# Patient Record
Sex: FEMALE | Race: OTHER | NOT HISPANIC OR LATINO
[De-identification: names, ages, dates, MRNs, and addresses within clinical notes are randomized per-mention and may not be internally consistent; named-entity substitution may affect disease eponyms.]

---

## 2019-07-03 DIAGNOSIS — Z80.1 FAMILY HISTORY OF MALIGNANT NEOPLASM OF TRACHEA, BRONCHUS AND LUNG: ICD-10-CM

## 2019-07-03 DIAGNOSIS — I10 ESSENTIAL (PRIMARY) HYPERTENSION: ICD-10-CM

## 2019-07-03 PROBLEM — Z00.00 ENCOUNTER FOR PREVENTIVE HEALTH EXAMINATION: Status: ACTIVE | Noted: 2019-07-03

## 2019-07-03 RX ORDER — ATENOLOL 50 MG/1
50 TABLET ORAL
Refills: 0 | Status: ACTIVE | COMMUNITY

## 2019-07-18 ENCOUNTER — APPOINTMENT (OUTPATIENT)
Dept: GASTROENTEROLOGY | Facility: CLINIC | Age: 69
End: 2019-07-18
Payer: COMMERCIAL

## 2019-07-18 VITALS
WEIGHT: 145 LBS | HEART RATE: 64 BPM | BODY MASS INDEX: 25.69 KG/M2 | HEIGHT: 63 IN | SYSTOLIC BLOOD PRESSURE: 130 MMHG | DIASTOLIC BLOOD PRESSURE: 86 MMHG

## 2019-07-18 DIAGNOSIS — Z86.69 PERSONAL HISTORY OF OTHER DISEASES OF THE NERVOUS SYSTEM AND SENSE ORGANS: ICD-10-CM

## 2019-07-18 DIAGNOSIS — K29.30 CHRONIC SUPERFICIAL GASTRITIS W/OUT BLEEDING: ICD-10-CM

## 2019-07-18 PROCEDURE — 99244 OFF/OP CNSLTJ NEW/EST MOD 40: CPT

## 2019-07-18 NOTE — ASSESSMENT
[FreeTextEntry1] : Dark stool x 2 days, probably melena\par Cleared, no further bleeding \par Was taking NSAIDs/ASA\par R/O PUD, Gastritis\par \par P: No NSAIDS/ETOH\par    Anti-reflux diet\par    Pepcid--> nexium 20mg bid\par    EGD\par    get labs form PCP\par     check cbc, bmet\par \par \par \par \par \par 2. Gastritis: bettter, No N, V, early satiety, pain\par s/p probable melena\par * No H.Pylori, No IM, No Bile, No NSAIDs\par * Avoid NSAIDs / ETOH\par * No Prevpac, No Pylera, \par * +++PPI: Nexium 20mg bid  x 90 days,  No Carafate 1 gm QID\par *  for EGD  or  No UBT \par \par \par \par \par \par \par 2. Hemorrhoids:  well – controlled.  No pain,  swelling,  itch,  bleeding\par * Discussed the potential complications of thrombosis,  pain,  infection,  swelling, itching,  bleeding \par * High – Fiber Diet was reviewed and emphasized\par * 6  --  8 cups of decaffeinated fluid daily\par * Sitz Bathes BID,  No:  Anusol HC  Suppos / Cream  CO BID\par * No:  Tucks BID,   No:  Balneol Lotion,   No:  Calmoseptine Oint,   ++ Prep H prn\par * No:  Colorectal surgical evaluation for possible ablation \par \par \par \par \par \par 3. Colorectal Cancer Screening:  to be evaluated\par * Discussed the pre-malignant potential of polyps\par * Discussed the importance of f/u surveillance / screening\par * High-Fiber Diet was reviewed and emphasized\par * Anti-oxidants and ASA/NSAID Therapy reviewed\par * Given age > 45-51 yo\par * Recommend Colonoscopy\par * R/O  Colonic Neoplasia\par \par \par \par \par \par \par Informed Consent:\par * The risks & Benefits of EGD  /  Colonoscopy were discussed w patient.\par * This included but was not limited to perforation, bleeding, sedation /med rxns possibly requiring surgery, blood transfusions, antibiotics & CPR/Intubation.\par * Pt. understands & agrees to the procedures.\par * Pt. advised to D/C  ASA/NSAIDs  7  Days  \par * [ +++ ]  Dulcolax / Miralax / Mag. Citrate ,  [     ] Prepopik/ Clenpiq ,  [     ] Osmo Prep,  [    ] GoLytely,  prep. reviewed w Pt.\par * Hold  [           ] AM of procedure.\par * Hold  [           ] PM of procedure.\par * Take  [           ] PM of procedure.\par * Take  [   atenolol        ] AM of procedure.\par \par

## 2019-07-18 NOTE — HISTORY OF PRESENT ILLNESS
[de-identified] : \par PCP: Jack\par \par 68 yo F w h/o HTN, Glaucoma\par 6/2011 s/p adm w N/V--> coffee grounds and accelerated HTN )237/106); Hgb=14, BUN=13\par Rx w PPI for Gastritis\par Hemorrhoids\par \par Last year had a ankle FX, was taking advil prn + ASA 81 mg\par ~ 2 weeks ago, noted a black stool x 2, then brown\par Had mild epigastric pain x 1day--resolved\par stopped NSAIDs/ASA, took pepcid\par Had a CPX w Dr. Santiago recently stated Labs were fine \par \par \par Today: feeling well, no cp, sob. dizziness, weakness\par \par * Abd pain—resolved \par * Nausea—no\par * Vomit—no\par * Belching—no\par * Regurgitation—no\par * Ht burn—no\par * Throat Clearing—no\par * Globus—no\par * Cough—no\par * Hoarseness—no\par * Dysphagia—no\par * BMs: # 4 qd\par * Constipation—no\par * Diarrhea—no\par * Bloating—no\par * Flatulence—no\par * Gurgling—no\par * Melena—resolved \par * BPBPR—no\par * Anorexia—no\par * Wt. Loss-no\par \par \par \par

## 2019-08-15 ENCOUNTER — APPOINTMENT (OUTPATIENT)
Dept: GASTROENTEROLOGY | Facility: CLINIC | Age: 69
End: 2019-08-15
Payer: COMMERCIAL

## 2019-08-15 VITALS
HEIGHT: 63 IN | WEIGHT: 145 LBS | BODY MASS INDEX: 25.69 KG/M2 | HEART RATE: 69 BPM | SYSTOLIC BLOOD PRESSURE: 140 MMHG | DIASTOLIC BLOOD PRESSURE: 90 MMHG

## 2019-08-15 DIAGNOSIS — K92.1 MELENA: ICD-10-CM

## 2019-08-15 DIAGNOSIS — Z12.11 ENCOUNTER FOR SCREENING FOR MALIGNANT NEOPLASM OF COLON: ICD-10-CM

## 2019-08-15 DIAGNOSIS — R10.10 UPPER ABDOMINAL PAIN, UNSPECIFIED: ICD-10-CM

## 2019-08-15 DIAGNOSIS — K64.8 OTHER HEMORRHOIDS: ICD-10-CM

## 2019-08-15 PROCEDURE — 99214 OFFICE O/P EST MOD 30 MIN: CPT

## 2019-08-15 NOTE — HISTORY OF PRESENT ILLNESS
[de-identified] : \par PCP: Jack\par \par 68 yo F w h/o HTN, Glaucoma\par 6/2011 s/p adm w N/V--> coffee grounds and accelerated HTN )237/106); Hgb=14, BUN=13\par Rx w PPI for Gastritis\par Hemorrhoids\par \par 7/18/19 last seen: noted Last year had a ankle FX, was taking advil prn + ASA 81 mg\par ~ 2 weeks ago, noted a black stool x 2, then brown\par Had mild epigastric pain x 1day--resolved\par stopped NSAIDs/ASA, took pepcid\par Had CPX w Dr. Santiago recently :2/14/19: LDL=160, MS=846, cbc/cmet-wnl\par \par \par Today: feeling better, no cp, sob. dizziness, weakness\par No abd pain, BMs # 4 qd, no melena \par \par * Abd pain—resolved \par * Nausea—no\par * Vomit—no\par * Belching—no\par * Regurgitation—no\par * Ht burn—no\par * Throat Clearing—no\par * Globus—no\par * Cough—no\par * Hoarseness—no\par * Dysphagia—no\par * BMs: # 4 qd\par * Constipation—no\par * Diarrhea—no\par * Bloating—no\par * Flatulence—no\par * Gurgling—no\par * Melena—resolved \par * BPBPR—no\par * Anorexia—no\par * Wt. Loss-no\par \par \par \par

## 2019-08-15 NOTE — ASSESSMENT
[FreeTextEntry1] : \par Dark stool x 2 days, probably melena\par Cleared, no further bleeding \par Was taking NSAIDs/ASA\par R/O PUD, Gastritis\par \par P: No NSAIDS/ETOH\par    Anti-reflux diet\par    Pepcid--> nexium 20mg bid\par    EGD\par    \par     check cbc, bmet\par \par \par \par \par \par 2. Gastritis: better, No N, V, early satiety, pain\par s/p probable melena\par * No H.Pylori, No IM, No Bile, No NSAIDs\par * Avoid NSAIDs / ETOH\par * No Prevpac, No Pylera, \par * +++PPI: Nexium 20mg bid  x 90 days,  No Carafate 1 gm QID\par *  for EGD  or  No UBT \par \par \par \par \par \par \par 2. Hemorrhoids:  well – controlled.  No pain,  swelling,  itch,  bleeding\par * Discussed the potential complications of thrombosis,  pain,  infection,  swelling, itching,  bleeding \par * High – Fiber Diet was reviewed and emphasized\par * 6  --  8 cups of decaffeinated fluid daily\par * Sitz Bathes BID,  No:  Anusol HC  Suppos / Cream  NC BID\par * No:  Tucks BID,   No:  Balneol Lotion,   No:  Calmoseptine Oint,   ++ Prep H prn\par * No:  Colorectal surgical evaluation for possible ablation \par \par \par \par \par \par 3. Colorectal Cancer Screening:  to be evaluated\par * Discussed the pre-malignant potential of polyps\par * Discussed the importance of f/u surveillance / screening\par * High-Fiber Diet was reviewed and emphasized\par * Anti-oxidants and ASA/NSAID Therapy reviewed\par * Given age > 45-51 yo\par * Recommend Colonoscopy\par * R/O  Colonic Neoplasia\par \par \par \par \par \par \par Informed Consent:\par * The risks & Benefits of EGD  /  Colonoscopy were discussed w patient.\par * This included but was not limited to perforation, bleeding, sedation /med rxns possibly requiring surgery, blood transfusions, antibiotics & CPR/Intubation.\par * Pt. understands & agrees to the procedures.\par * Pt. advised to D/C  ASA/NSAIDs  7  Days  \par * [ +++ ]  Dulcolax / Miralax / Mag. Citrate ,  [     ] Prepopik/ Clenpiq ,  [     ] Osmo Prep,  [    ] GoLytely,  prep. reviewed w Pt.\par * Hold  [           ] AM of procedure.\par * Hold  [           ] PM of procedure.\par * Take  [           ] PM of procedure.\par * Take  [   atenolol        ] AM of procedure.\par \par

## 2019-10-23 ENCOUNTER — APPOINTMENT (OUTPATIENT)
Dept: GASTROENTEROLOGY | Facility: HOSPITAL | Age: 69
End: 2019-10-23

## 2020-04-15 ENCOUNTER — APPOINTMENT (OUTPATIENT)
Dept: GASTROENTEROLOGY | Facility: HOSPITAL | Age: 70
End: 2020-04-15